# Patient Record
Sex: FEMALE | URBAN - METROPOLITAN AREA
[De-identification: names, ages, dates, MRNs, and addresses within clinical notes are randomized per-mention and may not be internally consistent; named-entity substitution may affect disease eponyms.]

---

## 2023-07-24 ENCOUNTER — HOSPITAL ENCOUNTER (EMERGENCY)
Facility: CLINIC | Age: 38
End: 2023-07-24

## 2023-07-24 NOTE — ED NOTES
Expected Patient Referral to ED  5:09 PM    Referring Clinic/Provider:  [facility]    Reason for referral/Clinical facts:  35 weeks pregnant, SOB, hx of DVT on lovenox, no pregnancy concerns, being sent for PE rule out    Recommendations provided:  Send to ED for further evaluation    Caller was informed that this institution does possess the capabilities and/or resources to provide for patient and should be transferred to our facility.    Discussed that if direct admit is sought and any hurdles are encountered, this ED would be happy to see the patient and evaluate.    Informed caller that recommendations provided are recommendations based only on the facts provided and that they responsible to accept or reject the advice, or to seek a formal in person consultation as needed and that this ED will see/treat patient should they arrive.      Tiffanie Mike MD  [facility] EMERGENCY ROOM  [address] [place] [id number]  [phone]       Tiffanie Mike MD  07/24/23 4924